# Patient Record
Sex: FEMALE | Race: WHITE | NOT HISPANIC OR LATINO
[De-identification: names, ages, dates, MRNs, and addresses within clinical notes are randomized per-mention and may not be internally consistent; named-entity substitution may affect disease eponyms.]

---

## 2018-05-16 ENCOUNTER — RECORD ABSTRACTING (OUTPATIENT)
Age: 58
End: 2018-05-16

## 2018-05-16 RX ORDER — PNV NO.95/FERROUS FUM/FOLIC AC 28MG-0.8MG
500-125 TABLET ORAL
Refills: 0 | Status: ACTIVE | COMMUNITY

## 2018-05-16 RX ORDER — ASCORBIC ACID 500 MG
TABLET ORAL DAILY
Refills: 0 | Status: ACTIVE | COMMUNITY

## 2018-06-05 ENCOUNTER — APPOINTMENT (OUTPATIENT)
Dept: PLASTIC SURGERY | Facility: CLINIC | Age: 58
End: 2018-06-05
Payer: SELF-PAY

## 2018-06-05 PROCEDURE — 11950 SUBQ NJX FILLING MATRL 1CC/<: CPT

## 2018-06-08 ENCOUNTER — APPOINTMENT (OUTPATIENT)
Dept: PLASTIC SURGERY | Facility: CLINIC | Age: 58
End: 2018-06-08
Payer: SELF-PAY

## 2018-06-08 PROCEDURE — 11950 SUBQ NJX FILLING MATRL 1CC/<: CPT

## 2018-09-21 ENCOUNTER — APPOINTMENT (OUTPATIENT)
Dept: PLASTIC SURGERY | Facility: CLINIC | Age: 58
End: 2018-09-21
Payer: SELF-PAY

## 2018-09-21 PROCEDURE — 11950 SUBQ NJX FILLING MATRL 1CC/<: CPT | Mod: NC

## 2019-03-26 ENCOUNTER — APPOINTMENT (OUTPATIENT)
Dept: PLASTIC SURGERY | Facility: CLINIC | Age: 59
End: 2019-03-26

## 2019-03-28 ENCOUNTER — APPOINTMENT (OUTPATIENT)
Dept: PLASTIC SURGERY | Facility: CLINIC | Age: 59
End: 2019-03-28
Payer: SELF-PAY

## 2019-03-28 ENCOUNTER — APPOINTMENT (OUTPATIENT)
Dept: PLASTIC SURGERY | Facility: CLINIC | Age: 59
End: 2019-03-28

## 2019-03-28 DIAGNOSIS — L72.9 FOLLICULAR CYST OF THE SKIN AND SUBCUTANEOUS TISSUE, UNSPECIFIED: ICD-10-CM

## 2019-03-28 PROCEDURE — 99024 POSTOP FOLLOW-UP VISIT: CPT

## 2019-03-28 NOTE — HISTORY OF PRESENT ILLNESS
[FreeTextEntry1] : pt had infection of cyst under l breast last week requiring po antibiotic trial of keflex , but the cyst pointed and became painful and fluctuant over the weekend  pt was seen in er sunday and had i and i and packing of wound.  culture grew s staph l and path c w cyst .  pt seen for packing change and wound is clean and granulation beginning .  disc dressing changes biweekly and will finish last 2 d abx.

## 2019-09-12 ENCOUNTER — APPOINTMENT (OUTPATIENT)
Dept: PLASTIC SURGERY | Facility: CLINIC | Age: 59
End: 2019-09-12
Payer: SELF-PAY

## 2019-09-12 PROCEDURE — 11950 SUBQ NJX FILLING MATRL 1CC/<: CPT

## 2019-09-19 ENCOUNTER — TRANSCRIPTION ENCOUNTER (OUTPATIENT)
Age: 59
End: 2019-09-19

## 2020-02-19 ENCOUNTER — APPOINTMENT (OUTPATIENT)
Dept: PLASTIC SURGERY | Facility: CLINIC | Age: 60
End: 2020-02-19
Payer: SELF-PAY

## 2020-02-19 ENCOUNTER — APPOINTMENT (OUTPATIENT)
Dept: PLASTIC SURGERY | Facility: HOSPITAL | Age: 60
End: 2020-02-19

## 2020-02-19 PROCEDURE — 11950 SUBQ NJX FILLING MATRL 1CC/<: CPT | Mod: 59

## 2020-02-19 NOTE — ASSESSMENT
[FreeTextEntry1] : JOSE NICK  was here for procedural Botox and radiesse injections.  She  tolerated the procedures well and will return for next dose in 4-5 months.  Instructions were reviewed.\par

## 2020-02-19 NOTE — PROCEDURE
[FreeTextEntry6] : JOSE NICK is complaining of dynamic rhytids of the face and desires botulinum toxin and radiesse for temporary reduction in these rhytids.  The risks benefits alternatives limitations and permanent scars were outlined with her . Under aseptic conditions, Botulinum Toxin was administered in the desired area. Please see face sheet for lot , site and dose information. \par \par Please see the scanned face sheet for lot and dose information.\par

## 2020-09-25 ENCOUNTER — APPOINTMENT (OUTPATIENT)
Dept: PLASTIC SURGERY | Facility: CLINIC | Age: 60
End: 2020-09-25
Payer: SELF-PAY

## 2020-09-25 PROCEDURE — 99214 OFFICE O/P EST MOD 30 MIN: CPT | Mod: NC

## 2020-09-25 NOTE — PROCEDURE
[FreeTextEntry6] : JOSE NICK is complaining of dynamic rhytids of the face and desires botulinum toxin radiesse and restylane silk for temporary reduction in these rhytids.  The risks benefits alternatives limitations and permanent scars were outlined with her . Under aseptic conditions, Botulinum Toxin was administered in the desired area. Please see face sheet for lot , site and dose information. \par \par Please see the scanned face sheet for lot and dose information.\par

## 2020-09-25 NOTE — ASSESSMENT
[FreeTextEntry1] : JOSE NICK  was here for procedural Botox radiesse and restylane silk injection.  She  tolerated the procedures well and will return for next dose in 4-5 months.  Instructions were reviewed.\par  pt is anticipating abdominoplasty and facelift surgery in the next year

## 2021-06-17 ENCOUNTER — APPOINTMENT (OUTPATIENT)
Dept: PLASTIC SURGERY | Facility: CLINIC | Age: 61
End: 2021-06-17

## 2021-07-22 ENCOUNTER — APPOINTMENT (OUTPATIENT)
Dept: PLASTIC SURGERY | Facility: CLINIC | Age: 61
End: 2021-07-22
Payer: SELF-PAY

## 2021-07-22 PROCEDURE — 11950 SUBQ NJX FILLING MATRL 1CC/<: CPT | Mod: 59

## 2021-07-28 NOTE — PROCEDURE
[FreeTextEntry6] : JOSE NICK is complaining of dynamic rhytids of the face and desires botulinum toxin for temporary reduction in these rhytids.  The risks benefits alternatives limitations and permanent scars were outlined with her . Under aseptic conditions, Botulinum Toxin was administered in the desired area. Please see face sheet for lot , site and dose information. \par \par Please see the scanned face sheet for lot and dose information. Aseptic administration of botox to indicated areas of the face.  See external sheet for lot and dose information \par JOSE NICK  is 61 year  female  complaining of having narrow atrophic lips The risks, benefits, alternatives, limitations and the permanent scars were outlined with the patient.  Under aseptic conditions and cold application, Restylane Silk administered via 30 g needle to upper and lower lips.  Good shape and contour noted with good symmetrical result . Cold compresses applied and instructions reviewed.

## 2021-07-28 NOTE — ASSESSMENT
[FreeTextEntry1] : JOSE NICK  was here for procedural Botox restylane silk and radiesse injections.  She  tolerated the procedures well and will return for next dose in 4-5 months.  Instructions were reviewed.

## 2021-11-16 ENCOUNTER — APPOINTMENT (OUTPATIENT)
Dept: PLASTIC SURGERY | Facility: CLINIC | Age: 61
End: 2021-11-16
Payer: SELF-PAY

## 2021-11-16 PROCEDURE — 11950 SUBQ NJX FILLING MATRL 1CC/<: CPT

## 2021-11-16 PROCEDURE — 64612 DESTROY NERVE FACE MUSCLE: CPT

## 2021-11-22 NOTE — PROCEDURE
[FreeTextEntry6] : JOSE NICK is complaining of dynamic rhytids of the face and desires botulinum toxin and radoiesse for temporary reduction in these rhytids.  The risks benefits alternatives limitations and permanent scars were outlined with her . Under aseptic conditions, Botulinum Toxin and was administered in the desired area. Please see face sheet for lot , site and dose information. \par \par Please see the scanned face sheet for lot and dose information. Aseptic administration of botox and radiesse  to indicated areas of the face.  See external sheet for lot and dose information

## 2021-11-22 NOTE — ASSESSMENT
[FreeTextEntry1] : JOSE NICK  was here for procedural Botox and radiesse injection.  She  tolerated the procedure well and will return for next dose in 4-5 months.  Instructions were reviewed. \par Ms. JOSE NICK is a 61 year White woman who has consulted with me regarding improving the contour of her  her abdomen. JOSE  is a candidate for abdominoplasty and diastasis rectus repair.  The permanent scar along the lower abdomen from hip to hip and around the umbilicus, was demonstrated and explained.   There is no hernia present and some liposuction may be required along the lateral hip area to further improve the contour.  Drains will be used and then removed in one week.  The risks, benefits, alternatives, limitations, and the permanent scars were outlined with the patient and She will consider scheduling surgery under general anesthesia at a convenient time.  All questions were answered. pt has inflammatory bowel disorder 3 x h/o diverticulitis and has umbilical hernia  she is going to start supplement regimen and lose weight and will consider abdominoplasty

## 2022-02-15 ENCOUNTER — APPOINTMENT (OUTPATIENT)
Dept: PLASTIC SURGERY | Facility: CLINIC | Age: 62
End: 2022-02-15
Payer: COMMERCIAL

## 2022-02-15 VITALS
TEMPERATURE: 97.8 F | DIASTOLIC BLOOD PRESSURE: 96 MMHG | HEART RATE: 82 BPM | OXYGEN SATURATION: 100 % | SYSTOLIC BLOOD PRESSURE: 137 MMHG | RESPIRATION RATE: 20 BRPM

## 2022-02-15 DIAGNOSIS — L81.9 DISORDER OF PIGMENTATION, UNSPECIFIED: ICD-10-CM

## 2022-02-15 PROCEDURE — 64612 DESTROY NERVE FACE MUSCLE: CPT

## 2022-02-15 PROCEDURE — 11300 SHAVE SKIN LESION 0.5 CM/<: CPT

## 2022-02-15 PROCEDURE — 11310 SHAVE SKIN LESION 0.5 CM/<: CPT | Mod: 1L

## 2022-02-15 NOTE — PROCEDURE
[FreeTextEntry6] : JOSE NICK is complaining of dynamic rhytids of the face and desires botulinum toxin for temporary reduction in these rhytids.  The risks benefits alternatives limitations and permanent scars were outlined with her . Under aseptic conditions, Botulinum Toxin was administered in the desired area. Please see face sheet for lot , site and dose information. \par \par Please see the scanned face sheet for lot and dose information. Aseptic administration of botox to indicated areas of the face.  See external sheet for lot and dose information

## 2022-02-15 NOTE — ASSESSMENT
[FreeTextEntry1] : JOSE tolerated the procedure well. The instructions were reviewed in detail with JOSE. The risks, benefits, alternatives, limitations and the permanent scars were outlined with the patient.

## 2022-02-15 NOTE — PROCEDURE
[Nl] : None [FreeTextEntry1] : sternal lesion 8 mm , left leg lesion 6 mm l chest lesion 3 mm  [FreeTextEntry6] : With aseptic technique and local anesthetic 1% lidocaine with 100,000 epinephrine. The surgical site was sterilely cleansed and injected with 1/2 cc of lidocaine. Using a #15 blade a superficial shave biopsy was completed, and the area was desiccated with electrocautery. The patient tolerated the procedure well and was given instructions regarding care  [FreeTextEntry2] : shave biiopsy

## 2022-06-08 ENCOUNTER — APPOINTMENT (OUTPATIENT)
Dept: PLASTIC SURGERY | Facility: CLINIC | Age: 62
End: 2022-06-08

## 2022-06-08 PROCEDURE — 64612 DESTROY NERVE FACE MUSCLE: CPT

## 2022-06-08 PROCEDURE — 11950 SUBQ NJX FILLING MATRL 1CC/<: CPT

## 2022-06-08 NOTE — ASSESSMENT
[FreeTextEntry1] : JOSE NICK  was here for procedural Botox and radiesse injection.  She  tolerated the procedure well and will return for next dose in 4-5 months.  Instructions were reviewed.

## 2022-06-08 NOTE — PROCEDURE
[FreeTextEntry6] : JOSE NICK is complaining of dynamic rhytids of the face and desires botulinum toxinand radiesse  for temporary reduction in these rhytids.  The risks benefits alternatives limitations and permanent scars were outlined with her . Under aseptic conditions, Botulinum Toxin was administered in the desired area. Please see face sheet for lot , site and dose information. \par \par Please see the scanned face sheet for lot and dose information. Aseptic administration of botox and radiesse  to indicated areas of the face.  See external sheet for lot and dose information 
none

## 2022-08-01 NOTE — PROCEDURE
[FreeTextEntry6] : JOSE NICK is complaining of dynamic rhytids of the face and desires botulinum toxin and radiesse   for temporary reduction in these rhytids.  The risks benefits alternatives limitations and permanent scars were outlined with her . Under aseptic conditions, Botulinum Toxin was administered in the desired area. Please see face sheet for lot , site and dose information. \par \par Please see the scanned face sheet for lot and dose information. Aseptic administration of botox and radiesse  to indicated areas of the face.  See external sheet for lot and dose information

## 2022-10-06 ENCOUNTER — APPOINTMENT (OUTPATIENT)
Dept: PLASTIC SURGERY | Facility: CLINIC | Age: 62
End: 2022-10-06

## 2022-10-06 PROCEDURE — 64612 DESTROY NERVE FACE MUSCLE: CPT

## 2022-10-11 NOTE — ASSESSMENT
[FreeTextEntry1] : JOSE NICK  was here for procedural Botox injection.  She  tolerated the procedure well and will return for next dose in 4-5 months.  Instructions were reviewed. Ms. JOSE NICK has consulted regarding facial plastic surgery.  She is prepared for modified facelift with autologous fat grafting  to the nasolabial folds, labial mandibular folds and malar areas bilaterally.  JOSE understands the limitations of surgical tightening and that there are other maintenance issues that will not resolve with surgery,  such as textural concerns, muscular and other rhytids and creases due to natural aging and habitual facial movements.  She will consider the material risks, benefits, alternatives, limitations and the permanent scars and will schedule surgery at a convenient time. we also ddiscussed lower blepharoplasty The risks, benefits, alternatives, limitations and the permanent scars were outlined with the patient.

## 2022-10-11 NOTE — PROCEDURE
[FreeTextEntry6] : JOSE NICK is complaining of dynamic rhytids of the face and desires botulinum toxin for temporary reduction in these rhytids.  The risks benefits alternatives limitations and permanent scars were outlined with her . Under aseptic conditions, Botulinum Toxin was administered in the desired area. Please see face sheet for lot , site and dose information. \par \par Please see the scanned face sheet for lot and dose information. Aseptic administration of botox to indicated areas of the face.  See external sheet for lot and dose information JOSE NICK is a 62 year old White  female complaining of facial ptosis, with skin laxity of the face and neck, lipodystrophy of the face and neck with prominent jowls, submental and neck fat, She also has platysma banding, an obtuse cervicomental angle and muscular laxity.  She  desires facial rejuvenation with surgery. The risks, benefits, alternatives, limitations , and the permanent scars were outlined with the patient.  In addition to facial ptosis, she has atrophy and descent of the malar fat pad areas, and creases in the areas of the nasolabial fords and labial mandibular folds  to autologous fat grafting from the abdominal subcutaneous tissues to the nasolabial folds, labial mandibular folds and malar areas bilaterally. Mr/Ms Fn Ln] is also complaining of the aged appearance of her upper and lower eyelids.  She has skin laxity on the upper eyelids, along with puffiness that is prominent and heavy.  On the lower eyelids, there is some puffiness as well, along with a tear trough deformity below her bags.  The canthus is mildly lax and will need to be tightened.  There is some skin excess and we discussed a scar along the natural skin supra tarsal fold crease above the eye in the upper eyelid and just below the lash line on the lower eyelids. We also discussed redraping of the fat in the lower eyelids and canthal support with suture tightening.  The patient is a good candidate for the proposed surgery and will benefit from the procedure. All questions were answered and the risks, benefits, alternatives, limitations, and permanent scars were outlined with her. All questions were answered and the patient is a healthy non- smoker and prepared to schedule the surgery in the near future.  She will get medical clearance prior to the surgery .\par

## 2023-01-03 ENCOUNTER — TRANSCRIPTION ENCOUNTER (OUTPATIENT)
Age: 63
End: 2023-01-03

## 2023-01-05 ENCOUNTER — TRANSCRIPTION ENCOUNTER (OUTPATIENT)
Age: 63
End: 2023-01-05

## 2023-02-10 ENCOUNTER — APPOINTMENT (OUTPATIENT)
Dept: PLASTIC SURGERY | Facility: CLINIC | Age: 63
End: 2023-02-10
Payer: SELF-PAY

## 2023-02-10 PROCEDURE — 15789 CHEMICAL PEEL FACIAL DERMAL: CPT

## 2023-03-02 ENCOUNTER — APPOINTMENT (OUTPATIENT)
Dept: PLASTIC SURGERY | Facility: CLINIC | Age: 63
End: 2023-03-02
Payer: SELF-PAY

## 2023-03-02 VITALS
DIASTOLIC BLOOD PRESSURE: 95 MMHG | RESPIRATION RATE: 28 BRPM | HEART RATE: 56 BPM | SYSTOLIC BLOOD PRESSURE: 137 MMHG | OXYGEN SATURATION: 99 %

## 2023-03-02 DIAGNOSIS — H02.409 UNSPECIFIED PTOSIS OF UNSPECIFIED EYELID: ICD-10-CM

## 2023-03-02 PROCEDURE — 99213 OFFICE O/P EST LOW 20 MIN: CPT

## 2023-03-13 ENCOUNTER — APPOINTMENT (OUTPATIENT)
Dept: PLASTIC SURGERY | Facility: HOSPITAL | Age: 63
End: 2023-03-13
Payer: SELF-PAY

## 2023-03-13 ENCOUNTER — TRANSCRIPTION ENCOUNTER (OUTPATIENT)
Age: 63
End: 2023-03-13

## 2023-03-13 PROCEDURE — 15773 GRFG AUTOL FAT LIPO 25 CC/<: CPT | Mod: 59

## 2023-03-13 PROCEDURE — 15820 BLEPHAROPLASTY LOWER EYELID: CPT | Mod: 59

## 2023-03-13 PROCEDURE — 15825 RHYTDCT NCK PLTYSML TGHTG: CPT

## 2023-03-17 ENCOUNTER — APPOINTMENT (OUTPATIENT)
Dept: PLASTIC SURGERY | Facility: CLINIC | Age: 63
End: 2023-03-17
Payer: SELF-PAY

## 2023-03-17 VITALS
RESPIRATION RATE: 20 BRPM | DIASTOLIC BLOOD PRESSURE: 88 MMHG | SYSTOLIC BLOOD PRESSURE: 148 MMHG | HEART RATE: 70 BPM | OXYGEN SATURATION: 98 %

## 2023-03-17 PROCEDURE — 99024 POSTOP FOLLOW-UP VISIT: CPT

## 2023-03-20 NOTE — ASSESSMENT
[FreeTextEntry1] : JOSE is doing very well no delayed healing and uncomplicated recovery The instructions were reviewed in detail with JOSE. All of JOSE 's questions and concerns were addressed and answered completely JOSE will return to the office for a post procedure visit

## 2023-03-20 NOTE — HISTORY OF PRESENT ILLNESS
[FreeTextEntry1] : JOSE seen c h  she is doing well after facelift lower bleph and fat grafts JOSE  has had uncomplicated recovery from surgery and anesthesia The patient denies fever,chills, shortness of breath, chest pain, calf pain All of JOSE 's questions and concerns were addressed and answered completely All of JOSE's incisions are clean dry and healing well.  Her  sutures were removed and areas steri stripped. .

## 2023-03-21 ENCOUNTER — APPOINTMENT (OUTPATIENT)
Dept: PLASTIC SURGERY | Facility: CLINIC | Age: 63
End: 2023-03-21
Payer: SELF-PAY

## 2023-03-21 VITALS
OXYGEN SATURATION: 100 % | SYSTOLIC BLOOD PRESSURE: 144 MMHG | HEART RATE: 76 BPM | RESPIRATION RATE: 20 BRPM | DIASTOLIC BLOOD PRESSURE: 92 MMHG | TEMPERATURE: 97.6 F

## 2023-03-21 PROCEDURE — 99024 POSTOP FOLLOW-UP VISIT: CPT

## 2023-03-27 NOTE — HISTORY OF PRESENT ILLNESS
[FreeTextEntry1] : JOSE is doing well after facelift lower bleph and fat grafts from abdomen The patient denies fever,chills, shortness of breath, chest pain, calf pain JOSE  has had uncomplicated recovery from surgery and anesthesia All of JOSE 's questions and concerns were addressed and answered completely The instructions were reviewed in detail with JOSE. All of JOSE's incisions are clean dry and healing well.  Her  sutures were removed and areas steri stripped.

## 2023-03-27 NOTE — ASSESSMENT
[FreeTextEntry1] : The instructions were reviewed in detail with JOSE. All of JOSE 's questions and concerns were addressed and answered completely JOSE will return to the office for a post procedure visit 4-6 weeks

## 2023-04-11 ENCOUNTER — APPOINTMENT (OUTPATIENT)
Dept: PLASTIC SURGERY | Facility: CLINIC | Age: 63
End: 2023-04-11

## 2023-04-19 PROBLEM — H02.409 FACIAL PTOSIS: Status: ACTIVE | Noted: 2022-10-11

## 2023-04-19 NOTE — HISTORY OF PRESENT ILLNESS
[FreeTextEntry1] : discussed facelift  fat grafts and lower blepharoplasty surgery The risks, benefits, alternatives, limitations and the permanent scars were outlined with the patient. All of JOSE 's questions and concerns were addressed and answered completely

## 2023-04-19 NOTE — ASSESSMENT
[FreeTextEntry1] : The instructions were reviewed in detail with JOSE. All of JOSE 's questions and concerns were addressed and answered completely .surgery scheduled

## 2023-07-11 ENCOUNTER — APPOINTMENT (OUTPATIENT)
Dept: PLASTIC SURGERY | Facility: CLINIC | Age: 63
End: 2023-07-11
Payer: SELF-PAY

## 2023-07-11 PROCEDURE — 64612 DESTROY NERVE FACE MUSCLE: CPT

## 2023-07-11 NOTE — ASSESSMENT
[FreeTextEntry1] : JOSE NICK  was here for procedural Botox injection.  She  tolerated the procedure well and will return for next dose in 4-5 months.  Instructions were reviewed. \par Ms. JOSE NICK is a 63 year White woman complaining of skin and muscle laxity in the abdomen.  She  is    Pregnancy        and is not planning any further children.  JOSE has gained significant weight with pregnancy and now has residual pannus deformity and diastasis rectus.  The risks, benefits, alternatives, limitations, and the permanent scars were outlined with the patient and we have determined that abdominoplasty and diastasis rectus repair will be the best option for her .  All questions were answered. Ms. JOSE NICK is a 63 year White woman who has consulted with me regarding improving the contour of her  her abdomen. JOSE  is a candidate for abdominoplasty and diastasis rectus repair.  The permanent scar along the lower abdomen from hip to hip and around the umbilicus, was demonstrated and explained.   There is no hernia present and some liposuction may be required along the lateral hip area to further improve the contour.  Drains will be used and then removed in one week.  The risks, benefits, alternatives, limitations, and the permanent scars were outlined with the patient and She will consider scheduling surgery under general anesthesia at a convenient time.  All questions were answered.

## 2023-08-31 ENCOUNTER — NON-APPOINTMENT (OUTPATIENT)
Age: 63
End: 2023-08-31

## 2023-08-31 ENCOUNTER — APPOINTMENT (OUTPATIENT)
Dept: BARIATRICS/WEIGHT MGMT | Facility: CLINIC | Age: 63
End: 2023-08-31
Payer: COMMERCIAL

## 2023-08-31 VITALS
HEART RATE: 88 BPM | BODY MASS INDEX: 26.7 KG/M2 | DIASTOLIC BLOOD PRESSURE: 80 MMHG | WEIGHT: 156.38 LBS | RESPIRATION RATE: 16 BRPM | HEIGHT: 64 IN | SYSTOLIC BLOOD PRESSURE: 144 MMHG | OXYGEN SATURATION: 100 %

## 2023-08-31 DIAGNOSIS — Z82.61 FAMILY HISTORY OF ARTHRITIS: ICD-10-CM

## 2023-08-31 DIAGNOSIS — Z82.49 FAMILY HISTORY OF ISCHEMIC HEART DISEASE AND OTHER DISEASES OF THE CIRCULATORY SYSTEM: ICD-10-CM

## 2023-08-31 DIAGNOSIS — Z78.9 OTHER SPECIFIED HEALTH STATUS: ICD-10-CM

## 2023-08-31 DIAGNOSIS — Z87.19 PERSONAL HISTORY OF OTHER DISEASES OF THE DIGESTIVE SYSTEM: ICD-10-CM

## 2023-08-31 DIAGNOSIS — Z83.3 FAMILY HISTORY OF DIABETES MELLITUS: ICD-10-CM

## 2023-08-31 DIAGNOSIS — Z00.00 ENCOUNTER FOR GENERAL ADULT MEDICAL EXAMINATION W/OUT ABNORMAL FINDINGS: ICD-10-CM

## 2023-08-31 DIAGNOSIS — K90.0 CELIAC DISEASE: ICD-10-CM

## 2023-08-31 DIAGNOSIS — K57.80 DIVERTICULITIS OF INTESTINE, PART UNSPECIFIED, WITH PERFORATION AND ABSCESS W/OUT BLEEDING: ICD-10-CM

## 2023-08-31 DIAGNOSIS — Z83.49 FAMILY HISTORY OF OTHER ENDOCRINE, NUTRITIONAL AND METABOLIC DISEASES: ICD-10-CM

## 2023-08-31 DIAGNOSIS — Z83.438 FAMILY HISTORY OF OTHER DISORDER OF LIPOPROTEIN METABOLISM AND OTHER LIPIDEMIA: ICD-10-CM

## 2023-08-31 PROCEDURE — 99205 OFFICE O/P NEW HI 60 MIN: CPT

## 2023-08-31 NOTE — REVIEW OF SYSTEMS
Spoke to mother regarding appt reminder for tomorrow 10/08/2019, she said she will like to reschedule she has to go to [place]  Appointment reschedule for 10/18/2019  [Negative] : Allergic/Immunologic [MED-ROS-Musclo-FT] : bilat knee pain

## 2023-08-31 NOTE — ASSESSMENT
[FreeTextEntry1] : 63F PMH overweight, elevated BP readings, diverticulitis s/p resection, osteoarthritis knees who presents to weight management for initial evaluation.  # Overweight w/comorbidities of osteoarthritis of knees (and fasting BG in pre-DM, BP persistently hypertensive w/o formal dx yet by PMD): Weight today 156 lbs 6 oz, BMI ~26.9. We discussed that she currently does not technically meet criteria for medical treatment w/BMI >30, or BMI >27 + comorbidity. Given that she recently had BMI ~28, controlled with persistent exercise and maintaining at this weight (BMI just .1 less than cut-off), as well as that she is developing comorbidities of OA knees and HTN (and pre-DM according to last BG), I am willing to treat medically but we discussed that she will not meet criteria for insurance coverage and thus have to pay out of pocket, and we will carefully decide safest options. Her diet is generally unrefined, minimal snacks or night eating. She does eat at restaurants frequently and has several glasses of wine per week and these are the most obvious cultprits at this time.  - Food log - Meal prep/planning, may benefit from less frequent restaurants and take-out - Discussed dietary recs, may ditch granola, stick to meals, plant forward, unprocessed foods, c/w no night eating. - Limit alcohol.  - C/w exercise - Updated Labs, including lipid panel and A1c.  - Given new dx HTN and some recent snoring, may consider sleep study. Start w/weight loss.  - Discussed medications, given her hesitance, as well as elevated BP, leaning away from stimulant based medications. Discussed thoroughly the potential benefit of Metformin with lifestyle changes. She opts for Rybelsus out of pocket.  - F/u in 1 month

## 2023-08-31 NOTE — HISTORY OF PRESENT ILLNESS
[FreeTextEntry1] : 63F PMH overweight, elevated BP readings, diverticulitis s/p resection, osteoarthritis knees who presents to weight management for initial evaluation.  Weight/Diet History: She was "trim" until 36, estimating 115 lbs, even after 3 kids. Then started new job and life stressors, gained, but since 41 she was this weight.  Reached 163 lbs during COVID, tried exercise a lot, sometimes 2x/d on the bike, wasn't losing. Has lost a little since then but stopped, may be regaining. Notes caretaker stress (her elderly mother) and work stress. She saw a weight loss practice in the past, was prescribed "amphetamines" but didn't want to take them, never tried them. BP at home been >140/>80 consistently at home, pending CPE with PMD.  Weight today: 156 lbs 6 oz, BMI 26.84  Diet: B (9-11): Yogurt, granola L (): Salad bar - Salad w/grilled chicken, garbanzo beans, beets, feta, balsamic. May skip if breakfast was late.  D (7:30): Veggie and pasta. Fish when she goes out, w/vegetables.  Dessert: rare Snack: no, rarely a piece of fruit Beverages: [place], water, wine. Coffee w/splash of milk x2 per day. Night-time eating: No Binging:  Fast-food/Restaurants: No fast food. Take-out 2x/month (usually sushi, asian). Restaurant 3x/wk. Cook/Prepare meals: Doesn't orozco Added oils: Food Journal: no  Exercise: [place] 30 min 3x/wk  Sleep: Typically falls asleep ~10 pm, up ~6 am. Gets a "solid" 8 hours of sleep. Snores 'softly'  Social Hx: family, work, substance. No smoking. Summer 2 glasses of wine most days. Lives with  and mother. [employer], travels for work. Grown kids.

## 2023-09-25 ENCOUNTER — APPOINTMENT (OUTPATIENT)
Dept: BARIATRICS/WEIGHT MGMT | Facility: CLINIC | Age: 63
End: 2023-09-25

## 2023-09-27 ENCOUNTER — APPOINTMENT (OUTPATIENT)
Dept: BARIATRICS/WEIGHT MGMT | Facility: CLINIC | Age: 63
End: 2023-09-27
Payer: COMMERCIAL

## 2023-09-27 VITALS — WEIGHT: 146 LBS | HEIGHT: 64 IN | BODY MASS INDEX: 24.92 KG/M2

## 2023-09-27 PROCEDURE — 99214 OFFICE O/P EST MOD 30 MIN: CPT | Mod: 95

## 2023-10-04 ENCOUNTER — APPOINTMENT (OUTPATIENT)
Dept: BARIATRICS/WEIGHT MGMT | Facility: CLINIC | Age: 63
End: 2023-10-04

## 2023-10-26 ENCOUNTER — APPOINTMENT (OUTPATIENT)
Dept: PLASTIC SURGERY | Facility: CLINIC | Age: 63
End: 2023-10-26
Payer: SELF-PAY

## 2023-10-26 DIAGNOSIS — L98.8 OTHER SPECIFIED DISORDERS OF THE SKIN AND SUBCUTANEOUS TISSUE: ICD-10-CM

## 2023-10-26 PROCEDURE — 17999 UNLISTD PX SKN MUC MEMB SUBQ: CPT

## 2023-11-16 ENCOUNTER — APPOINTMENT (OUTPATIENT)
Dept: PLASTIC SURGERY | Facility: CLINIC | Age: 63
End: 2023-11-16

## 2023-11-20 ENCOUNTER — APPOINTMENT (OUTPATIENT)
Dept: PLASTIC SURGERY | Facility: CLINIC | Age: 63
End: 2023-11-20

## 2023-11-27 ENCOUNTER — APPOINTMENT (OUTPATIENT)
Dept: PLASTIC SURGERY | Facility: CLINIC | Age: 63
End: 2023-11-27
Payer: SELF-PAY

## 2023-11-27 PROCEDURE — 15789 CHEMICAL PEEL FACIAL DERMAL: CPT

## 2023-12-07 PROBLEM — L81.9 PIGMENTED SKIN LESION OF UNCERTAIN NATURE: Status: ACTIVE | Noted: 2022-02-10

## 2023-12-15 ENCOUNTER — APPOINTMENT (OUTPATIENT)
Dept: PLASTIC SURGERY | Facility: CLINIC | Age: 63
End: 2023-12-15
Payer: SELF-PAY

## 2023-12-15 DIAGNOSIS — L81.9 DISORDER OF PIGMENTATION, UNSPECIFIED: ICD-10-CM

## 2023-12-15 PROCEDURE — 64612 DESTROY NERVE FACE MUSCLE: CPT

## 2023-12-15 PROCEDURE — 17999 UNLISTD PX SKN MUC MEMB SUBQ: CPT

## 2023-12-18 ENCOUNTER — APPOINTMENT (OUTPATIENT)
Dept: BARIATRICS/WEIGHT MGMT | Facility: CLINIC | Age: 63
End: 2023-12-18
Payer: COMMERCIAL

## 2023-12-18 VITALS — WEIGHT: 145 LBS | HEIGHT: 64 IN | BODY MASS INDEX: 24.75 KG/M2

## 2023-12-18 DIAGNOSIS — R03.0 ELEVATED BLOOD-PRESSURE READING, W/OUT DIAGNOSIS OF HYPERTENSION: ICD-10-CM

## 2023-12-18 DIAGNOSIS — R73.01 IMPAIRED FASTING GLUCOSE: ICD-10-CM

## 2023-12-18 DIAGNOSIS — E66.9 OVERWEIGHT: ICD-10-CM

## 2023-12-18 DIAGNOSIS — M17.0 BILATERAL PRIMARY OSTEOARTHRITIS OF KNEE: ICD-10-CM

## 2023-12-18 DIAGNOSIS — E66.3 OVERWEIGHT: ICD-10-CM

## 2023-12-18 PROBLEM — L81.9 DYSCHROMIA: Status: ACTIVE | Noted: 2023-12-18

## 2023-12-18 PROCEDURE — 99214 OFFICE O/P EST MOD 30 MIN: CPT | Mod: 95

## 2023-12-18 RX ORDER — ORAL SEMAGLUTIDE 14 MG/1
14 TABLET ORAL
Qty: 30 | Refills: 2 | Status: ACTIVE | COMMUNITY
Start: 2023-08-31 | End: 1900-01-01

## 2023-12-18 RX ORDER — ORAL SEMAGLUTIDE 3 MG/1
3 TABLET ORAL
Qty: 30 | Refills: 2 | Status: DISCONTINUED | OUTPATIENT
Start: 2023-08-31 | End: 2023-12-18

## 2023-12-18 NOTE — HISTORY OF PRESENT ILLNESS
[Home] : at home, [unfilled] , at the time of the visit. [Medical Office: ([facility])___] : at the medical office located in  [Verbal consent obtained from patient] : the patient, [unfilled] [FreeTextEntry1] : 63F PMH overweight, elevated BP readings, diverticulitis s/p resection, osteoarthritis knees who presents to weight management for follow-up.  She initially presented 8/2023 reporting that she weighed ~115 lbs at age 36 despite 3 pregnancies, but started to gain significantly at age 41 in the setting of new job and life stressors. She reached max weight of 163 lbs during COVID, and has since struggled to lose despite trials of frequent exercise. She noted caretaker stress (her elderly mother) in addition to work stress and sedentary lifestyle.  We discussed lifestyle and dietary interventions. We discussed medical therapy in the setting of recent BMI to 28 w/ comorbidities (OA knees and HTN, pre-DM). She was prescribed Rybelsus.  Weight today: 145 lbs, BMI 24.9 Weight at last visit (9/27/23): 146 lbs, BMI 25.06 Weight at Initial Visit (8/31/23): 156 lbs 6 oz, BMI 26.84  Medication: She has been on Rybelsus 7 mg/d for the past ~3 months, has had a lot of burping, no other side effects.   Diet:  B: Packet of oatmeal, yogurt L: no lunch D: fish/legumes/protein, vegetables. Rare snacking, but at this time of year with grandchildren around has   Exercise: Exercising 4x/wk, 30 min on [place]. Overall has increased exercise.   Sleep: Like a log. Snores softly.

## 2023-12-18 NOTE — ASSESSMENT
[FreeTextEntry1] : 63F PMH overweight, elevated BP readings, diverticulitis s/p resection, osteoarthritis knees who presents to weight management for follow-up.  # Overweight w/comorbidities of osteoarthritis of knees (and fasting BG in pre-DM, BP persistently hypertensive w/o formal dx yet by PMD): Weight today 145 lbs, BMI 24.9, is down 1 lbs since last visit ~10 weeks ago, and 11.5 lbs since initial visit ~14 weeks ago. On Rybelsus 7 mg/d, tolerating well except for frequent burping. She is doing more exercise and monitoring her diet, has maintained her weight loss since last visit.  - Food log - Meal prep/planning, may benefit from less frequent restaurants and take-out - Discussed dietary recs, may ditch granola, stick to meals, plant forward, unprocessed foods, c/w no night eating. - Limit alcohol. - C/w exercise - Will send labs from PMD - Given new dx HTN and some recent snoring, may consider sleep study. Start w/weight loss. - Trial increase Rybelsus to 14 mg/d - F/u in 2-3 months.

## 2023-12-18 NOTE — PHYSICAL EXAM
[Obese, well nourished, in no acute distress] : obese, well nourished, in no acute distress [de-identified] : TEB visit

## 2023-12-18 NOTE — PROCEDURE
[FreeTextEntry6] : max g and 1540 laser for dyschromia and skin laxity  JOSE tolerated the procedure well. The instructions were reviewed in detail with JOSE.

## 2023-12-18 NOTE — ASSESSMENT
[FreeTextEntry1] : JOSE  has had uncomplicated recovery from surgery and anesthesia JOSE tolerated the procedure well. The instructions were reviewed in detail with JOSE. JOSE will return to the office for a post procedure visit

## 2023-12-19 ENCOUNTER — TRANSCRIPTION ENCOUNTER (OUTPATIENT)
Age: 63
End: 2023-12-19

## 2024-01-26 ENCOUNTER — APPOINTMENT (OUTPATIENT)
Dept: PLASTIC SURGERY | Facility: CLINIC | Age: 64
End: 2024-01-26

## 2024-07-26 ENCOUNTER — APPOINTMENT (OUTPATIENT)
Dept: PLASTIC SURGERY | Facility: CLINIC | Age: 64
End: 2024-07-26
Payer: SELF-PAY

## 2024-07-26 DIAGNOSIS — L98.8 OTHER SPECIFIED DISORDERS OF THE SKIN AND SUBCUTANEOUS TISSUE: ICD-10-CM

## 2024-07-26 PROCEDURE — 64612 DESTROY NERVE FACE MUSCLE: CPT

## 2024-07-26 NOTE — PROCEDURE
[FreeTextEntry6] : JOSE NICK is complaining of dynamic rhytids of the face and desires botulinum toxin for temporary reduction in these rhytids.  The risks benefits alternatives limitations and permanent scars were outlined with her . Under aseptic conditions, Botulinum Toxin was administered in the desired area. Please see face sheet for lot , site and dose information.   Please see the scanned face sheet for lot and dose information. Aseptic administration of botox to indicated areas of the face.  See external sheet for lot and dose information

## 2024-07-26 NOTE — ASSESSMENT
[FreeTextEntry1] : JOSE NICK  was here for procedural Botox injection.  She  tolerated the procedure well and will return for next dose in 4-5 months.  Instructions were reviewed.

## 2024-09-03 ENCOUNTER — APPOINTMENT (OUTPATIENT)
Dept: BARIATRICS/WEIGHT MGMT | Facility: CLINIC | Age: 64
End: 2024-09-03

## 2024-10-10 ENCOUNTER — TRANSCRIPTION ENCOUNTER (OUTPATIENT)
Age: 64
End: 2024-10-10

## 2025-01-17 ENCOUNTER — APPOINTMENT (OUTPATIENT)
Dept: PLASTIC SURGERY | Facility: CLINIC | Age: 65
End: 2025-01-17

## 2025-01-17 PROCEDURE — 64612 DESTROY NERVE FACE MUSCLE: CPT

## 2025-01-22 RX ORDER — CEPHALEXIN 500 MG/1
500 TABLET ORAL
Qty: 1 | Refills: 0 | Status: ACTIVE | COMMUNITY
Start: 2025-01-22 | End: 1900-01-01

## 2025-01-24 ENCOUNTER — RESULT REVIEW (OUTPATIENT)
Age: 65
End: 2025-01-24

## 2025-01-24 ENCOUNTER — APPOINTMENT (OUTPATIENT)
Dept: PLASTIC SURGERY | Facility: CLINIC | Age: 65
End: 2025-01-24
Payer: COMMERCIAL

## 2025-01-24 DIAGNOSIS — D17.1 BENIGN LIPOMATOUS NEOPLASM OF SKIN AND SUBCUTANEOUS TISSUE OF TRUNK: ICD-10-CM

## 2025-01-24 DIAGNOSIS — R19.8 OTHER SPECIFIED SYMPTOMS AND SIGNS INVOLVING THE DIGESTIVE SYSTEM AND ABDOMEN: ICD-10-CM

## 2025-01-24 DIAGNOSIS — L81.9 DISORDER OF PIGMENTATION, UNSPECIFIED: ICD-10-CM

## 2025-01-24 PROCEDURE — 13101 CMPLX RPR TRUNK 2.6-7.5 CM: CPT

## 2025-01-24 PROCEDURE — 13101 CMPLX RPR TRUNK 2.6-7.5 CM: CPT | Mod: NC

## 2025-01-30 ENCOUNTER — APPOINTMENT (OUTPATIENT)
Dept: PLASTIC SURGERY | Facility: CLINIC | Age: 65
End: 2025-01-30

## 2025-01-30 PROCEDURE — 99024 POSTOP FOLLOW-UP VISIT: CPT

## 2025-02-10 ENCOUNTER — APPOINTMENT (OUTPATIENT)
Dept: PLASTIC SURGERY | Facility: HOSPITAL | Age: 65
End: 2025-02-10
Payer: SELF-PAY

## 2025-02-10 ENCOUNTER — TRANSCRIPTION ENCOUNTER (OUTPATIENT)
Age: 65
End: 2025-02-10

## 2025-02-10 PROCEDURE — 15847 EXC SKIN ABD ADD-ON: CPT

## 2025-02-10 PROCEDURE — 15830 EXC EXCESSIVE SKIN ABDOMEN: CPT | Mod: NC,59

## 2025-02-14 ENCOUNTER — APPOINTMENT (OUTPATIENT)
Dept: PLASTIC SURGERY | Facility: HOSPITAL | Age: 65
End: 2025-02-14

## 2025-02-14 VITALS
RESPIRATION RATE: 20 BRPM | HEART RATE: 103 BPM | TEMPERATURE: 97.7 F | DIASTOLIC BLOOD PRESSURE: 84 MMHG | SYSTOLIC BLOOD PRESSURE: 122 MMHG | OXYGEN SATURATION: 100 %

## 2025-02-14 DIAGNOSIS — R19.8 OTHER SPECIFIED SYMPTOMS AND SIGNS INVOLVING THE DIGESTIVE SYSTEM AND ABDOMEN: ICD-10-CM

## 2025-02-14 PROCEDURE — 99024 POSTOP FOLLOW-UP VISIT: CPT

## 2025-02-18 ENCOUNTER — APPOINTMENT (OUTPATIENT)
Dept: PLASTIC SURGERY | Facility: CLINIC | Age: 65
End: 2025-02-18
Payer: COMMERCIAL

## 2025-02-18 PROCEDURE — 99024 POSTOP FOLLOW-UP VISIT: CPT

## 2025-02-20 ENCOUNTER — APPOINTMENT (OUTPATIENT)
Dept: PLASTIC SURGERY | Facility: CLINIC | Age: 65
End: 2025-02-20

## 2025-02-20 PROCEDURE — 99024 POSTOP FOLLOW-UP VISIT: CPT

## 2025-02-21 NOTE — ASSESSMENT
[FreeTextEntry1] : JOSE NICK  was here for procedural Botox injection.  She  tolerated the procedure well and will return for next dose in 4-5 months.  Instructions were reviewed. \par 
[FreeTextEntry1] : JOSE NICK  was here for procedural Botox injection.  She  tolerated the procedure well and will return for next dose in 4-5 months.  Instructions were reviewed. \par 
NULL

## 2025-03-14 ENCOUNTER — APPOINTMENT (OUTPATIENT)
Dept: PLASTIC SURGERY | Facility: CLINIC | Age: 65
End: 2025-03-14

## 2025-03-14 PROCEDURE — MS003: CPT | Mod: NC

## 2025-07-30 ENCOUNTER — NON-APPOINTMENT (OUTPATIENT)
Age: 65
End: 2025-07-30

## 2025-08-01 ENCOUNTER — APPOINTMENT (OUTPATIENT)
Dept: PLASTIC SURGERY | Facility: CLINIC | Age: 65
End: 2025-08-01

## 2025-08-01 PROCEDURE — D0090: CPT | Mod: NC
